# Patient Record
Sex: MALE | Race: WHITE | NOT HISPANIC OR LATINO | Employment: STUDENT | URBAN - METROPOLITAN AREA
[De-identification: names, ages, dates, MRNs, and addresses within clinical notes are randomized per-mention and may not be internally consistent; named-entity substitution may affect disease eponyms.]

---
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
## 2022-06-29 ENCOUNTER — OFFICE VISIT (OUTPATIENT)
Dept: INTERNAL MEDICINE | Facility: CLINIC | Age: 22
End: 2022-06-29
Payer: COMMERCIAL

## 2022-06-29 ENCOUNTER — PATIENT MESSAGE (OUTPATIENT)
Dept: INTERNAL MEDICINE | Facility: CLINIC | Age: 22
End: 2022-06-29

## 2022-06-29 ENCOUNTER — PATIENT MESSAGE (OUTPATIENT)
Dept: INTERNAL MEDICINE | Facility: CLINIC | Age: 22
End: 2022-06-29
Payer: COMMERCIAL

## 2022-06-29 VITALS
BODY MASS INDEX: 21.7 KG/M2 | HEART RATE: 62 BPM | SYSTOLIC BLOOD PRESSURE: 110 MMHG | DIASTOLIC BLOOD PRESSURE: 60 MMHG | WEIGHT: 146.5 LBS | HEIGHT: 69 IN | OXYGEN SATURATION: 99 %

## 2022-06-29 DIAGNOSIS — Z00.00 ANNUAL PHYSICAL EXAM: Primary | ICD-10-CM

## 2022-06-29 DIAGNOSIS — R40.0 DAYTIME SOMNOLENCE: Primary | ICD-10-CM

## 2022-06-29 PROCEDURE — 1159F PR MEDICATION LIST DOCUMENTED IN MEDICAL RECORD: ICD-10-PCS | Mod: CPTII,S$GLB,, | Performed by: INTERNAL MEDICINE

## 2022-06-29 PROCEDURE — 3008F BODY MASS INDEX DOCD: CPT | Mod: CPTII,S$GLB,, | Performed by: INTERNAL MEDICINE

## 2022-06-29 PROCEDURE — 3074F SYST BP LT 130 MM HG: CPT | Mod: CPTII,S$GLB,, | Performed by: INTERNAL MEDICINE

## 2022-06-29 PROCEDURE — 99395 PREV VISIT EST AGE 18-39: CPT | Mod: S$GLB,,, | Performed by: INTERNAL MEDICINE

## 2022-06-29 PROCEDURE — 3078F PR MOST RECENT DIASTOLIC BLOOD PRESSURE < 80 MM HG: ICD-10-PCS | Mod: CPTII,S$GLB,, | Performed by: INTERNAL MEDICINE

## 2022-06-29 PROCEDURE — 1159F MED LIST DOCD IN RCRD: CPT | Mod: CPTII,S$GLB,, | Performed by: INTERNAL MEDICINE

## 2022-06-29 PROCEDURE — 3078F DIAST BP <80 MM HG: CPT | Mod: CPTII,S$GLB,, | Performed by: INTERNAL MEDICINE

## 2022-06-29 PROCEDURE — 99395 PR PREVENTIVE VISIT,EST,18-39: ICD-10-PCS | Mod: S$GLB,,, | Performed by: INTERNAL MEDICINE

## 2022-06-29 PROCEDURE — 99999 PR PBB SHADOW E&M-EST. PATIENT-LVL III: ICD-10-PCS | Mod: PBBFAC,,, | Performed by: INTERNAL MEDICINE

## 2022-06-29 PROCEDURE — 3008F PR BODY MASS INDEX (BMI) DOCUMENTED: ICD-10-PCS | Mod: CPTII,S$GLB,, | Performed by: INTERNAL MEDICINE

## 2022-06-29 PROCEDURE — 3074F PR MOST RECENT SYSTOLIC BLOOD PRESSURE < 130 MM HG: ICD-10-PCS | Mod: CPTII,S$GLB,, | Performed by: INTERNAL MEDICINE

## 2022-06-29 PROCEDURE — 99999 PR PBB SHADOW E&M-EST. PATIENT-LVL III: CPT | Mod: PBBFAC,,, | Performed by: INTERNAL MEDICINE

## 2022-06-29 NOTE — PROGRESS NOTES
[facility] Note    Chief Complaint      Chief Complaint   Patient presents with    Establish Care       History of Present Illness      He Silva is a 21 y.o. male with no chronic conditions who presents today for: establish care and annual preventative visit.  Previously seen by Dr. Edgardo Nice.  Diet: Prepares own food msotly.  Limiting fatty foods and carbs.  Drinks plenty water  Exercise: running treadmill, gym workouts daily.    Denies drinking and driving, drinking more than 4 drinks on occasion, drug use.     Flu shot UTD.  TdAP 2013.       Past Medical History:  History reviewed. No pertinent past medical history.    Past Surgical History:   has no past surgical history on file.    Family History:  family history is not on file.     Social History:  Social History     Tobacco Use    Smoking status: Never Smoker    Smokeless tobacco: Former User     Types: Snuff     Quit date: 10/6/2020       I personally reviewed all past medical, surgical, social and family history.    Review of Systems   Constitutional: Negative for chills, fever and malaise/fatigue.   Respiratory: Negative for shortness of breath.    Cardiovascular: Negative for chest pain.   Gastrointestinal: Negative for constipation, diarrhea, nausea and vomiting.   Skin: Negative for rash.   Neurological: Negative for weakness.   All other systems reviewed and are negative.       Medications:  No outpatient encounter medications on file as of 6/29/2022.     No facility-administered encounter medications on file as of 6/29/2022.       Allergies:  Review of patient's allergies indicates:  No Known Allergies    Health Maintenance:  Immunization History   Administered Date(s) Administered    COVID-19, MRNA, LN-S, PF (Pfizer) (Purple Cap) 07/16/2021, 08/06/2021    DTaP 2002, 02/03/2003, 04/02/2003, 02/09/2004, 10/05/2006    Hep B / HiB 2002, 02/03/2003, 04/15/2004    IPV 2002, 02/03/2003,  "02/09/2004, 10/05/2006    Influenza - Quadrivalent - MDCK - PF 11/06/2017    Influenza - Quadrivalent - PF *Preferred* (6 months and older) 10/04/2013, 11/20/2018, 10/02/2019    Influenza - Trivalent (ADULT) 10/09/2003, 11/10/2003, 12/15/2004, 11/01/2006, 10/16/2007, 10/27/2008    Influenza - Trivalent - PF (ADULT) 09/22/2005, 10/22/2011, 10/19/2012, 10/29/2014, 10/30/2015, 11/22/2016    MMR 04/15/2004, 10/05/2006    Meningococcal B, OMV 10/02/2019    Meningococcal Conjugate (MCV4P) 10/04/2013, 10/02/2019, 10/02/2019    PPD Test 05/10/2021    Pneumococcal Conjugate - 7 Valent 2002, 02/03/2003, 04/02/2003, 11/10/2003    Tdap 10/04/2013    Varicella 10/09/2003, 10/02/2007      Health Maintenance   Topic Date Due    HPV Vaccines (1 - Male 2-dose series) Never done    TETANUS VACCINE  10/04/2023    Hepatitis C Screening  Completed    Lipid Panel  Completed        Physical Exam      Vital Signs  Pulse: 62  SpO2: 99 %  BP: 110/60  BP Location: Right arm  Patient Position: Sitting  Pain Score: 0-No pain  Height and Weight  Height: 5' 9" (175.3 cm)  Weight: 66.4 kg (146 lb 7.9 oz)  BSA (Calculated - sq m): 1.8 sq meters  BMI (Calculated): 21.6  Weight in (lb) to have BMI = 25: 168.9]    Physical Exam  Vitals reviewed.   Constitutional:       Appearance: He is well-developed.   HENT:      Head: Normocephalic and atraumatic.      Right Ear: External ear normal.      Left Ear: External ear normal.   Cardiovascular:      Rate and Rhythm: Normal rate and regular rhythm.      Heart sounds: Normal heart sounds. No murmur heard.  Pulmonary:      Effort: Pulmonary effort is normal.      Breath sounds: Normal breath sounds. No wheezing or rales.   Abdominal:      General: Bowel sounds are normal.      Palpations: Abdomen is soft.          Laboratory:  CBC:  Recent Labs   Lab 05/10/21  0928   WBC 6.19   RBC 4.85   Hemoglobin 15.0   Hematocrit 45.2   Platelets 261   MCV 93   MCH 30.9   MCHC 33.2     CMP:  Recent " Labs   Lab 05/10/21  0928   Glucose 89   Calcium 9.9   Albumin 4.2   Total Protein 7.0   Sodium 137   Potassium 4.8   CO2 30 H   Chloride 102   BUN 13   Alkaline Phosphatase 77   ALT 12   AST 16   Total Bilirubin 0.6     URINALYSIS:  Recent Labs   Lab 05/10/21  0930   Color, UA Yellow   Specific Gravity, UA 1.015   pH, UA 6.0   Protein, UA Negative   Nitrite, UA Negative   Leukocytes, UA Negative      LIPIDS:  Recent Labs   Lab 05/10/21  0928   TSH 1.656   HDL 59   Cholesterol 149   Triglycerides 88   LDL Cholesterol 72.4   HDL/Cholesterol Ratio 39.6   Non-HDL Cholesterol 90   Total Cholesterol/HDL Ratio 2.5     TSH:  Recent Labs   Lab 05/10/21  0928   TSH 1.656     A1C:        Assessment/Plan     He Silva is a 21 y.o.male with:    1. Annual physical exam  Discussed diet and exercise, vaccines and cancer screening, risk factors.  Screening labs deferred.    Chronic conditions status updated as per HPI.  Other than changes above, cont current medications and maintain follow up with specialists.  Follow up in about 1 year (around 6/29/2023) for Annual preventative visit.    No future appointments.    Roni Winkler MD  [facility]

## 2022-09-24 ENCOUNTER — PATIENT MESSAGE (OUTPATIENT)
Dept: INTERNAL MEDICINE | Facility: CLINIC | Age: 22
End: 2022-09-24
Payer: COMMERCIAL

## 2022-12-08 ENCOUNTER — TELEPHONE (OUTPATIENT)
Dept: INTERNAL MEDICINE | Facility: CLINIC | Age: 22
End: 2022-12-08
Payer: COMMERCIAL

## 2022-12-08 ENCOUNTER — NURSE TRIAGE (OUTPATIENT)
Dept: ADMINISTRATIVE | Facility: CLINIC | Age: 22
End: 2022-12-08
Payer: COMMERCIAL

## 2022-12-08 NOTE — TELEPHONE ENCOUNTER
Reason for visit: The last week Gisela had a slue of issues come up. It began with a sore throat that turned into a stuffed nose that turned into diarrhea that is now turning into a runny nose with itchy eyes.      Comments:   I want to see if I maybe have mold in my system from my apartment or see if I have a bacteria. This is unusual. I never get sick.

## 2022-12-08 NOTE — TELEPHONE ENCOUNTER
It sounds like adenovirus infection.  Usually will run its course without complication.  We can see him but if he wants mold testing, I think allergy handles that

## 2022-12-08 NOTE — TELEPHONE ENCOUNTER
Reason for Disposition   Nasal discharge present > 10 days    Additional Information   Negative: Wheezing (high pitched whistling sound) and previous asthma attacks or use of asthma medicines   Negative: Doesn't match the SYMPTOMS for nasal allergy   Negative: Patient sounds very sick or weak to the triager   Negative: Lots of coughing   Negative: Lots of yellow or green discharge from nose, present > 3 days    Protocols used: Nasal Allergies (Hay Fever)-A-OH    Pt states he has been exposed to mold in his dorm. States he wakes up with a sore throat every day, but it goes away.     States he has cold symptoms, itchy runny eyes, and diarrhea for two days that has resolved.    No appointment are available with pt's PCP or the other Providers in that clinic. Advised to go to Urgent Care and to call [facility] back for new/worsening symptoms. Pt verbalized understanding.

## 2023-04-11 ENCOUNTER — PATIENT MESSAGE (OUTPATIENT)
Dept: GASTROENTEROLOGY | Facility: CLINIC | Age: 23
End: 2023-04-11

## 2023-04-11 ENCOUNTER — OFFICE VISIT (OUTPATIENT)
Dept: GASTROENTEROLOGY | Facility: CLINIC | Age: 23
End: 2023-04-11
Payer: COMMERCIAL

## 2023-04-11 ENCOUNTER — HOSPITAL ENCOUNTER (OUTPATIENT)
Dept: RADIOLOGY | Facility: HOSPITAL | Age: 23
Discharge: HOME | End: 2023-04-11
Attending: NURSE PRACTITIONER
Payer: COMMERCIAL

## 2023-04-11 VITALS — WEIGHT: 147.5 LBS | HEIGHT: 69 IN | BODY MASS INDEX: 21.84 KG/M2

## 2023-04-11 DIAGNOSIS — R19.7 DIARRHEA, UNSPECIFIED TYPE: Primary | ICD-10-CM

## 2023-04-11 DIAGNOSIS — R19.7 DIARRHEA, UNSPECIFIED TYPE: ICD-10-CM

## 2023-04-11 PROCEDURE — 74018 RADEX ABDOMEN 1 VIEW: CPT | Mod: 26,,, | Performed by: INTERNAL MEDICINE

## 2023-04-11 PROCEDURE — 3008F PR BODY MASS INDEX (BMI) DOCUMENTED: ICD-10-PCS | Mod: CPTII,S$GLB,, | Performed by: NURSE PRACTITIONER

## 2023-04-11 PROCEDURE — 74018 XR ABDOMEN AP 1 VIEW: ICD-10-PCS | Mod: 26,,, | Performed by: INTERNAL MEDICINE

## 2023-04-11 PROCEDURE — 99999 PR PBB SHADOW E&M-EST. PATIENT-LVL III: CPT | Mod: PBBFAC,,, | Performed by: NURSE PRACTITIONER

## 2023-04-11 PROCEDURE — 74018 RADEX ABDOMEN 1 VIEW: CPT | Mod: TC,FY

## 2023-04-11 PROCEDURE — 99204 OFFICE O/P NEW MOD 45 MIN: CPT | Mod: S$GLB,,, | Performed by: NURSE PRACTITIONER

## 2023-04-11 PROCEDURE — 3008F BODY MASS INDEX DOCD: CPT | Mod: CPTII,S$GLB,, | Performed by: NURSE PRACTITIONER

## 2023-04-11 PROCEDURE — 99204 PR OFFICE/OUTPT VISIT, NEW, LEVL IV, 45-59 MIN: ICD-10-PCS | Mod: S$GLB,,, | Performed by: NURSE PRACTITIONER

## 2023-04-11 PROCEDURE — 99999 PR PBB SHADOW E&M-EST. PATIENT-LVL III: ICD-10-PCS | Mod: PBBFAC,,, | Performed by: NURSE PRACTITIONER

## 2023-04-11 PROCEDURE — 1159F PR MEDICATION LIST DOCUMENTED IN MEDICAL RECORD: ICD-10-PCS | Mod: CPTII,S$GLB,, | Performed by: NURSE PRACTITIONER

## 2023-04-11 PROCEDURE — 1159F MED LIST DOCD IN RCRD: CPT | Mod: CPTII,S$GLB,, | Performed by: NURSE PRACTITIONER

## 2023-04-11 NOTE — PROGRESS NOTES
GASTROENTEROLOGY CLINIC NOTE    Chief Complaint: The encounter diagnosis was Diarrhea, unspecified type.  Referring provider/PCP: MD He San Yamil Silva is a 22 y.o. male who is a new patient to me without a significant PMH. He is here today to establish care for diarrhea. This is a new problem that began one day ago.   Went to [place] ([facility]) last week. Had one episode of vomiting first night he was at [place].  Vomitus consisted of partially digested food; no hematemesis.  Returned to [place] two days ago and began having diarrhea one day ago.  Diarrhea accompanied by headache, fatigue, dry mouth.  No fever or abdominal pain. Denies significant NSAID use. ETOH use one night while on vacation.     Diarrhea  How Long: One Day  Onset: Sudden  Bowel movements per day: 3-5; normal is once a week  Consistency: watery  Mucus/Oily/Fatty/Foul Smelling: no mucus or oily stool; foul smelling stool  Incomplete Emptying with bowel movements: some incomplete emptying  Large or Small Bowel Movements: large  Urgency/ Post Prandial/ Undigested Food: immediately after up to 20 minutes after eating; urgency with bowel movements  Nausea/Vomiting/Weight Loss: One episode of vomiting  Nocturnal Bowel Movements: No  Abdominal Pain or Cramping: no    Triggers: none    Artifical Sweeteners: no  NSAIDs: no  Caffeine:no  New medications:no  Antibiotics:no  Travel: [facility]  Cholecystectomy: No  Anticoagulation or Antiplatelet: No    History of H.pylori: No  H.pylori Treatment:  Prior Upper Endoscopy: No  Prior Colonoscopy: No  Family h/o Colon Cancer: No  Family h/o Crohn's Disease or Ulcerative Colitis: No  Family h/o Celiac Sprue: No  Abdominal Surgeries: No    Review of Systems   Constitutional:  Positive for malaise/fatigue. Negative for weight loss.   HENT:  Negative for sore throat.    Eyes:  Negative for blurred vision.   Respiratory:  Negative for cough.    Cardiovascular:  Negative for chest pain.  "  Gastrointestinal:  Positive for diarrhea and vomiting. Negative for abdominal pain, blood in stool, constipation, heartburn, melena and nausea.   Genitourinary:  Negative for dysuria.   Musculoskeletal:  Negative for myalgias.   Skin:  Negative for rash.   Neurological:  Positive for headaches.   Endo/Heme/Allergies:  Negative for environmental allergies.   Psychiatric/Behavioral:  Negative for suicidal ideas. The patient is not nervous/anxious.      Past Medical History: has no past medical history on file.    Past Surgical History: has no past surgical history on file.    Family History:family history is not on file.    Allergies: Review of patient's allergies indicates:  No Known Allergies    Social History: reports that he has never smoked. He quit smokeless tobacco use about 2 years ago.  His smokeless tobacco use included snuff.    Home medications:   No current outpatient medications on file prior to visit.     No current facility-administered medications on file prior to visit.       Vital signs:  Ht 5' 9" (1.753 m)   Wt 66.9 kg (147 lb 7.8 oz)   BMI 21.78 kg/m²     Physical Exam  Vitals reviewed.   Constitutional:       General: He is not in acute distress.     Appearance: Normal appearance. He is not ill-appearing.   HENT:      Head: Normocephalic.   Cardiovascular:      Rate and Rhythm: Normal rate and regular rhythm.      Heart sounds: Normal heart sounds. No murmur heard.  Pulmonary:      Effort: Pulmonary effort is normal. No respiratory distress.      Breath sounds: Normal breath sounds.   Chest:      Chest wall: No tenderness.   Abdominal:      General: Bowel sounds are normal. There is no distension.      Palpations: Abdomen is soft.      Tenderness: There is no abdominal tenderness. Negative signs include Osman's sign.      Hernia: No hernia is present.   Skin:     General: Skin is warm.   Neurological:      Mental Status: He is alert and oriented to person, place, and time.   Psychiatric:    "      Mood and Affect: Mood normal.         Behavior: Behavior normal.       Routine labs:  Lab Results   Component Value Date    WBC 6.19 05/10/2021    HGB 15.0 05/10/2021    HCT 45.2 05/10/2021    MCV 93 05/10/2021     05/10/2021     No results found for: INR  No results found for: IRON, FERRITIN, TIBC, FESATURATED  Lab Results   Component Value Date     05/10/2021    K 4.8 05/10/2021     05/10/2021    CO2 30 (H) 05/10/2021    BUN 13 05/10/2021    CREATININE 1.1 05/10/2021     Lab Results   Component Value Date    ALBUMIN 4.2 05/10/2021    ALT 12 05/10/2021    AST 16 05/10/2021    ALKPHOS 77 05/10/2021    BILITOT 0.6 05/10/2021     No results found for: GLUCOSE  Lab Results   Component Value Date    TSH 1.656 05/10/2021     Lab Results   Component Value Date    CALCIUM 9.9 05/10/2021       Imaging:  No image results found.      I have reviewed prior labs, imaging, and notes.      Assessment:  1. Diarrhea, unspecified type        Plan:  Orders Placed This Encounter    Clostridium difficile EIA    Stool culture    X-Ray Abdomen AP 1 View    Comprehensive Metabolic Panel    H. pylori Antibody, IgG    Tissue Transglutaminase, IgA    IgA    TSH    CBC Without Differential    Calprotectin, Stool    Giardia / Cryptosporidum, EIA    Stool Exam-Ova,Cysts,Parasites    Rotavirus antigen, stool    pH, stool     CBC, CMP, Celiac Labs  Stool Studies  Abdominal xray to evaluate stool burden and bowel gas pattern. R/o overflow diarrhea as normal bowel habits are once per week.   Metamucil   GI Upset Diet (handout given to patient)    Plan of care discussed with patient who is in agreement and verbalized understanding.     I have explained the planned procedures to the patient.The risks, benefits and alternatives of the procedure were also explained in detail. Patient verbalized understanding, all questions were answered. The patient agrees to proceed as planned    Follow Up: As Needed           Valorie  SARWAT Arreola,FNP-BC  [facility]    (Portions of this note were dictated using voice recognition software and may contain dictation related errors in spelling/grammar/syntax not found on text review)

## 2023-04-25 ENCOUNTER — PATIENT MESSAGE (OUTPATIENT)
Dept: GASTROENTEROLOGY | Facility: CLINIC | Age: 23
End: 2023-04-25
Payer: COMMERCIAL

## 2023-06-08 ENCOUNTER — OFFICE VISIT (OUTPATIENT)
Dept: ENDOCRINOLOGY | Facility: CLINIC | Age: 23
End: 2023-06-08
Payer: COMMERCIAL

## 2023-06-08 VITALS
BODY MASS INDEX: 21.11 KG/M2 | DIASTOLIC BLOOD PRESSURE: 80 MMHG | WEIGHT: 142.94 LBS | RESPIRATION RATE: 16 BRPM | HEART RATE: 69 BPM | OXYGEN SATURATION: 96 % | SYSTOLIC BLOOD PRESSURE: 110 MMHG

## 2023-06-08 DIAGNOSIS — R53.82 CHRONIC FATIGUE: ICD-10-CM

## 2023-06-08 DIAGNOSIS — R53.83 FATIGUE, UNSPECIFIED TYPE: Primary | ICD-10-CM

## 2023-06-08 PROCEDURE — 99204 PR OFFICE/OUTPT VISIT, NEW, LEVL IV, 45-59 MIN: ICD-10-PCS | Mod: S$GLB,,, | Performed by: INTERNAL MEDICINE

## 2023-06-08 PROCEDURE — 3008F BODY MASS INDEX DOCD: CPT | Mod: CPTII,S$GLB,, | Performed by: INTERNAL MEDICINE

## 2023-06-08 PROCEDURE — 3008F PR BODY MASS INDEX (BMI) DOCUMENTED: ICD-10-PCS | Mod: CPTII,S$GLB,, | Performed by: INTERNAL MEDICINE

## 2023-06-08 PROCEDURE — 99999 PR PBB SHADOW E&M-EST. PATIENT-LVL III: CPT | Mod: PBBFAC,,, | Performed by: INTERNAL MEDICINE

## 2023-06-08 PROCEDURE — 3079F DIAST BP 80-89 MM HG: CPT | Mod: CPTII,S$GLB,, | Performed by: INTERNAL MEDICINE

## 2023-06-08 PROCEDURE — 99999 PR PBB SHADOW E&M-EST. PATIENT-LVL III: ICD-10-PCS | Mod: PBBFAC,,, | Performed by: INTERNAL MEDICINE

## 2023-06-08 PROCEDURE — 3074F SYST BP LT 130 MM HG: CPT | Mod: CPTII,S$GLB,, | Performed by: INTERNAL MEDICINE

## 2023-06-08 PROCEDURE — 3079F PR MOST RECENT DIASTOLIC BLOOD PRESSURE 80-89 MM HG: ICD-10-PCS | Mod: CPTII,S$GLB,, | Performed by: INTERNAL MEDICINE

## 2023-06-08 PROCEDURE — 3074F PR MOST RECENT SYSTOLIC BLOOD PRESSURE < 130 MM HG: ICD-10-PCS | Mod: CPTII,S$GLB,, | Performed by: INTERNAL MEDICINE

## 2023-06-08 PROCEDURE — 1159F MED LIST DOCD IN RCRD: CPT | Mod: CPTII,S$GLB,, | Performed by: INTERNAL MEDICINE

## 2023-06-08 PROCEDURE — 1159F PR MEDICATION LIST DOCUMENTED IN MEDICAL RECORD: ICD-10-PCS | Mod: CPTII,S$GLB,, | Performed by: INTERNAL MEDICINE

## 2023-06-08 PROCEDURE — 99204 OFFICE O/P NEW MOD 45 MIN: CPT | Mod: S$GLB,,, | Performed by: INTERNAL MEDICINE

## 2023-06-08 NOTE — ASSESSMENT & PLAN NOTE
Fatigue x >2 year with normal labs.    Reassured that this is not related to thyroid given normal TSH and symptoms not consistent with hypothyroidism/hyperthyroidism.      Symptoms are consistent with sleep disturbance so referred to sleep medicine.

## 2023-06-08 NOTE — PROGRESS NOTES
ENDOCRINOLOGY INITIAL VISIT  06/08/2023    Reason for Visit:  referred by Self, Aaareferral for evaluation of fatigue     HPI:  He Silva is a 22 y.o. male with no significant PMH here for evaluation as he is concerned that thyroid is causing fatigue.  He was referred to sleep med but declined.  Reports normal sleep scores on apple watch.     Reports snoring (per GF), waking with daily headaches. Not feeling resting despite adequate sleep.    Thyroid Symptoms  + fatigue x 2 years    Weight change:  []  Gain [x]  Loss  []  Denies   Down 20 lbs due to poor food at college, difficulty gaining    Temperature intolerance:  [x]  Cold [x]  Hot   []  Denies   Both    GI:  [x]  2-4 BM/day []  Constipation [x]  Denies    Integument:  []  Hair loss []  Dry skin  [x]  Denies    Other:  [x]  Palpitations with exertion  []  tremor     [x]  Increased anxiety    []  Denies    Lab Results   Component Value Date    TSH 0.872 04/11/2023       Denies depressed mood or anhedonia    Recent labs with normal H/H, TSH, IgA, CMP  Patient also reports having testosterone checked recently which was normal.     Review of patient's allergies indicates:  No Known Allergies    No current outpatient medications on file.      ROS: see HPI       PHYSICAL EXAM  /80   Pulse 69   Resp 16   Wt 64.8 kg (142 lb 15.5 oz)   SpO2 96%   BMI 21.11 kg/m²   Wt Readings from Last 3 Encounters:   06/08/23 64.8 kg (142 lb 15.5 oz)   04/11/23 66.9 kg (147 lb 7.8 oz)   06/29/22 66.4 kg (146 lb 7.9 oz) (36 %, Z= -0.36)*     * Growth percentiles are based on [facility] (Boys, 2-20 Years) data.   ]    Constitutional:  Pleasant,  in no acute distress.   HENT:   Eyes:     No scleral icterus.   Respiratory:   Effort normal   Neurological:  normal speech  Psych:  Normal mood and affect.        IMAGING STUDIES    ASSESSMENT/PLAN    Problem List Items Addressed This Visit          Unprioritized    Chronic fatigue     Fatigue x >2 year with normal labs.     Reassured that this is not related to thyroid given normal TSH and symptoms not consistent with hypothyroidism/hyperthyroidism.      Symptoms are consistent with sleep disturbance so referred to sleep medicine.           Other Visit Diagnoses       Fatigue, unspecified type    -  Primary    Relevant Orders    Ambulatory referral/consult to Sleep Disorders          RTC prn       Lubna Liao MD